# Patient Record
Sex: FEMALE | Race: OTHER | Employment: FULL TIME | URBAN - METROPOLITAN AREA
[De-identification: names, ages, dates, MRNs, and addresses within clinical notes are randomized per-mention and may not be internally consistent; named-entity substitution may affect disease eponyms.]

---

## 2018-11-29 ENCOUNTER — APPOINTMENT (OUTPATIENT)
Dept: PHYSICAL THERAPY | Age: 43
End: 2018-11-29

## 2018-12-05 ENCOUNTER — HOSPITAL ENCOUNTER (OUTPATIENT)
Dept: PHYSICAL THERAPY | Age: 43
Discharge: HOME | End: 2018-12-05
Payer: COMMERCIAL

## 2018-12-05 PROCEDURE — 97161 PT EVAL LOW COMPLEX 20 MIN: CPT | Performed by: PHYSICAL THERAPIST

## 2018-12-05 PROCEDURE — 97110 THERAPEUTIC EXERCISES: CPT | Performed by: PHYSICAL THERAPIST

## 2018-12-05 NOTE — PROGRESS NOTES
PT INITIAL EVALUATION NOTE - [facility] 2-15 Patient Name: Pao Kennedy Date:2018 : 1975 [x]  Patient  Verified Payor: BLUE CROSS / Plan: [facility] PPO / Product Type: PPO / In time:110 P  Out time:210 P Total Treatment Time (min): 60 Total Timed Codes (min): 60 (50 eval, 10 timed see below) Visit #: 1 Treatment Area: Bilateral foot pain [id number], M79.38] SUBJECTIVE Any medication changes, allergies to medications, adverse drug reactions, diagnosis change, or new procedure performed?: [] No    [x] Yes (see summary sheet for update) Current symptoms/chief complaint: (R) foot \"pebble under foot sensation\" under 2nd metatarsal.   
\"scratchy\" under 3rd and 4th metatarsal  
Pt denies low back back pain Date of onset/injury: oct 2018 inc speed while running Pain:   0/10 max 0/10 min 0/10 now Aggravated by: standing prolonged sitting, walking after prolonged sitting, boots with tight toe box Eased by: stretching hip flexor, and stretching toes, cotton ball between toes Any tingling/numbness in LE: none Shoewear/orthotics: new pair of sneakers in July. 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  
Prior level of function/activity level: Since this has occurred, pt has stopped running and biking Continued swimming Continued yoga 1x/week Angie Taylor Patient goal: Eliminate sensation in feet so that pt can do triathalon. Wants to qualify for age group maximum. Would like to do RTC sprint in April. PMH:  Depression OBJECTIVE Observation: pes planus bilaterally (R>L) Gait: Pt \"heavy\" walker with decreased eccentric control noted during loading response bilaterally AROM and PROM (R) ankle WNL all planes MMT (R) ankle 5/5 all planes Tenderness to palpation: (R) adductor hallucis (both oblique and transverse heads) Edema: none Functional Biomechanical Screen Unilat HR on R:10 Unilat HR on L: 10 SLS on R:30 sec SLS on L: 30 sec Munir hop x 3: pain free Squat: full, pain-free Joint Mobility: hypermobility noted (R) talocrural joint. Dec 1st ray mobility OBJECTIVE [x] Skin assessment post-treatment:  [x]intact []redness- no adverse reaction 
  []redness  adverse reaction:  
 
10 min Therapeutic Exercise:  [x] See flow sheet :  
Rationale: increase ROM and increase strength to improve the patients ability to train for triathlon. With 
 [x] TE 
 [] TA 
 [] neuro 
 [] manual: Patient Education: [x] Review HEP [] Progressed/Changed HEP based on:  
[] positioning   [] body mechanics   [] transfers   [x] Ice application- pt advised to ice 10-15 min 1-2 x/day to area in order to dec inflammation 
[x] other:  re: mechanism of injury/condition, role of physical therapy, prognosis for recovery, heat vs ice, activity modifications. Advised trial of off the shelf orthotics Pain Level (0-10 scale) post treatment: 0 
 
ASSESSMENT/Changes in Function:  
 
[x]  See Plan of Care Diego Horne PT, DPT, CMTPT 
PT License Number: [id number]   12/5/2018  1:07 PM

## 2018-12-05 NOTE — PROGRESS NOTES
[employer] [facility] [address] [place], [address] [facility] Phone: [phone]  Fax: [phone] Plan of Care/Statement of Necessity for Physical Therapy Services  2-15 Patient name: Aguilar Sahni  : 1975  Provider#: [id number] Referral source: Pt presents via Direct Access Medical/Treatment Diagnosis: (R) metatarsalgia Prior Hospitalization: see medical history Comorbidities: see evaluation Prior Level of Function:see evaluation Medications: Verified on Patient Summary List 
Start of Care: 2018     Onset Date:see evaluation The Plan of Care and following information is based on the information from the initial evaluation. Assessment/ key information: Patient presents with signs and symptoms consistent with (R) metatarsalgia and will benefit from physical therapy to address deficits noted below in problem list.  
 
Evaluation Complexity History LOW Complexity : Zero comorbidities / personal factors that will impact the outcome / POC; Examination LOW Complexity : 1-2 Standardized tests and measures addressing body structure, function, activity limitation and / or participation in recreation  ;Presentation LOW Complexity : Stable, uncomplicated  ;Clinical Decision Making LOW Complexity : FOTO score of  Overall Complexity Rating: LOW Problem List: pain affecting function, decrease ROM, decrease strength, impaired gait/ balance, decrease ADL/ functional abilitiies, decrease activity tolerance and decrease flexibility/ joint mobility Treatment Plan may include any combination of the following: Therapeutic exercise, Therapeutic activities, Neuromuscular re-education, Physical agent/modality, Manual therapy, Patient education and Self Care training Patient / Family readiness to learn indicated by: asking questions, trying to perform skills and interest 
Persons(s) to be included in education: patient (P) Barriers to Learning/Limitations: None Patient Goal (s): please see evaluation in Connect [facility] Patient Self Reported Health Status: please see paper chart Rehabilitation Potential: excellent Short Term Goals: To be accomplished in 5 treatments: 
-Independent in HEP as evidenced on ability to perform at least 5 exercises from HEP using proper form without verbal cuing. 
-Pt will try off the shelf orthotic trial to see if it decreases pt's foot discomfort Long Term Goals: To be accomplished in 10 treatments: 
-Pt will be able to sit for 2 hours and then stand without discomfort or sensation 
-Pt will be able to run sprints without pain 
-Eliminate sensation plantar surface of foot Frequency / Duration: Patient to be seen 1-2 times per week for 8-10 weeks. *note: pt reports she is going out of town for the next two weeks and declined first avail appt offered after her vacation so follow-up is planned for late Dec 2018 or early Jan 2019. Patient/ Caregiver education and instruction: self care, activity modification and exercises 
 
[x]  Plan of care has been reviewed with PTA Certification Period: 12/5/2018 -  3/4/18 Diego Horne, PT, DPT, CMTPT      29/0/7970 9:90 PM 
PT License Number: [id number] 
_____________________________________________________________________ I certify that the above Therapy Services are being furnished while the patient is under my care. I agree with the treatment plan and certify that this therapy is necessary. [de-identified] Signature:____________________  Date:____________Time:_________

## 2019-01-02 ENCOUNTER — HOSPITAL ENCOUNTER (OUTPATIENT)
Dept: PHYSICAL THERAPY | Age: 44
Discharge: HOME | End: 2019-01-02
Payer: COMMERCIAL

## 2019-01-02 PROCEDURE — 97110 THERAPEUTIC EXERCISES: CPT | Performed by: PHYSICAL THERAPIST

## 2019-01-02 NOTE — PROGRESS NOTES
PT DAILY TREATMENT NOTE - [facility] 2-15 Patient Name: Bebe Code Date:2019 : 1975 [x]  Patient  Verified Payor: BLUE CROSS / Plan: [facility] PPO / Product Type: PPO / In time:110 P  Out time:205 P Total Treatment Time (min): 55 Total Timed Codes (min): 55 
1:1 Treatment Time ([place]): 40 Visit #: 2 Treatment Area: Bilateral foot pain [id number], M79.672] SUBJECTIVE Pain Level (0-10 scale): 0 Any medication changes, allergies to medications, adverse drug reactions, diagnosis change, or new procedure performed?: [x] No    [] Yes (see summary sheet for update) Subjective functional status/changes:    
Pt reports compliance with HEP-no more pain. Has been running, will try to  speed this week some. Pt reports she has been wearing the orthotics. OBJECTIVE [x] Skin assessment post-treatment:  [x]intact []redness- no adverse reaction 
  []redness  adverse reaction:  
 
40 min Therapeutic Exercise:  [x] See flow sheet :  
Rationale: increase ROM, increase strength and improve functional mobility to improve the patients ability to run With 
 [x] TE 
 [] manual 
 [] self care Patient Education: [x] Review HEP [] Progressed/Changed HEP based on:  
[] positioning   [] body mechanics   [] transfers   [] heat/ice application   
[] other:   
 
Other Objective/Functional Measures:  
 
Pain Level (0-10 scale) post treatment: 0 
 
ASSESSMENT Patient will continue to benefit from skilled PT services to modify and progress therapeutic interventions, address functional mobility deficits, address ROM deficits, address strength deficits and analyze and address soft tissue restrictions to attain remaining goals. Progress towards goals / Updated goals: PLAN 
[]  Upgrade activities as tolerated     [x]  Continue plan of care 
[]  Update interventions per flow sheet      
[]  Discharge due to:_ 
[]  Other:_   
 
 Raquel Schmid. Ozzie, PT, DPT, CMTPT    1/2/2019 PT License Number: [id number]

## 2019-01-10 ENCOUNTER — HOSPITAL ENCOUNTER (OUTPATIENT)
Dept: PHYSICAL THERAPY | Age: 44
Discharge: HOME | End: 2019-01-10
Payer: COMMERCIAL

## 2019-01-10 PROCEDURE — 97110 THERAPEUTIC EXERCISES: CPT | Performed by: PHYSICAL THERAPIST

## 2019-01-10 NOTE — PROGRESS NOTES
PT DAILY TREATMENT NOTE - [facility] 2-15 Patient Name: Joanna Lira Date:1/10/2019 : 1975 [x]  Patient  Verified Payor: BLUE CROSS / Plan: [facility] PPO / Product Type: PPO / In time:310 P  Out time:350 P Total Treatment Time (min): 40 Total Timed Codes (min): 40 
1:1 Treatment Time ([facility]): 40 Visit #: 3 Treatment Area: Bilateral foot pain [id number], M79.672] SUBJECTIVE Pain Level (0-10 scale): 0 Any medication changes, allergies to medications, adverse drug reactions, diagnosis change, or new procedure performed?: [x] No    [] Yes (see summary sheet for update) Subjective functional status/changes:    
Pt has been running, swimming, biking without issue. She is very happy with her progress so far. Wonders what will happen once she picks up the pace some. Also nervous about going back to full time work and sitting more. OBJECTIVE [x] Skin assessment post-treatment:  [x]intact []redness- no adverse reaction 
  []redness  adverse reaction:  
 
40 min Therapeutic Exercise:  [x] See flow sheet :  
Rationale: increase ROM, increase strength and improve functional mobility to improve the patients ability to run With 
 [x] TE 
 [] manual 
 [] self care Patient Education: [x] Review HEP [] Progressed/Changed HEP based on:  
[] positioning   [] body mechanics   [] transfers   [] heat/ice application   
[] other:   
 
Other Objective/Functional Measures:  
 
Pain Level (0-10 scale) post treatment: 0 
 
ASSESSMENT Patient will continue to benefit from skilled PT services to modify and progress therapeutic interventions, address functional mobility deficits, address ROM deficits, address strength deficits and analyze and address soft tissue restrictions to attain remaining goals. Progress towards goals / Updated goals: Pt with excellent form noted during new exercises today.   Advised to only speed up one activity at a time as to not exacerbate foot. PLAN 
[]  Upgrade activities as tolerated     [x]  Continue plan of care 
[]  Update interventions per flow sheet      
[]  Discharge due to:_ 
[]  Other:_ Malinda Sanchez. Ozzie PT, DPT, CMTPT    1/10/2019 PT License Number: [id number]

## 2019-02-14 ENCOUNTER — APPOINTMENT (OUTPATIENT)
Dept: PHYSICAL THERAPY | Age: 44
End: 2019-02-14

## 2022-12-25 ENCOUNTER — HOSPITAL ENCOUNTER (EMERGENCY)
Age: 47
Discharge: HOME | End: 2022-12-25
Attending: EMERGENCY MEDICINE
Payer: COMMERCIAL

## 2022-12-25 VITALS
HEIGHT: 62 IN | RESPIRATION RATE: 16 BRPM | SYSTOLIC BLOOD PRESSURE: 123 MMHG | DIASTOLIC BLOOD PRESSURE: 86 MMHG | HEART RATE: 63 BPM | WEIGHT: 118.17 LBS | OXYGEN SATURATION: 98 % | TEMPERATURE: 98.4 F | BODY MASS INDEX: 21.75 KG/M2

## 2022-12-25 DIAGNOSIS — U07.1 COVID-19: Primary | ICD-10-CM

## 2022-12-25 DIAGNOSIS — J06.9 VIRAL URI WITH COUGH: ICD-10-CM

## 2022-12-25 DIAGNOSIS — J02.9 SORE THROAT: ICD-10-CM

## 2022-12-25 PROCEDURE — 74011250637 HC RX REV CODE- 250/637: Performed by: EMERGENCY MEDICINE

## 2022-12-25 PROCEDURE — 99283 EMERGENCY DEPT VISIT LOW MDM: CPT

## 2022-12-25 RX ORDER — NAPROXEN 500 MG/1
500 TABLET ORAL
Qty: 20 TABLET | Refills: 0 | Status: SHIPPED | OUTPATIENT
Start: 2022-12-25 | End: 2023-01-04

## 2022-12-25 RX ORDER — ACETAMINOPHEN 500 MG
1000 TABLET ORAL ONCE
Status: COMPLETED | OUTPATIENT
Start: 2022-12-25 | End: 2022-12-25

## 2022-12-25 RX ORDER — BENZONATATE 100 MG/1
100 CAPSULE ORAL
Status: COMPLETED | OUTPATIENT
Start: 2022-12-25 | End: 2022-12-25

## 2022-12-25 RX ORDER — NAPROXEN 250 MG/1
500 TABLET ORAL
Status: COMPLETED | OUTPATIENT
Start: 2022-12-25 | End: 2022-12-25

## 2022-12-25 RX ORDER — BENZONATATE 100 MG/1
100 CAPSULE ORAL
Qty: 30 CAPSULE | Refills: 0 | Status: SHIPPED | OUTPATIENT
Start: 2022-12-25 | End: 2023-01-01

## 2022-12-25 RX ADMIN — BENZONATATE 100 MG: 100 CAPSULE ORAL at 02:49

## 2022-12-25 RX ADMIN — NAPROXEN 500 MG: 250 TABLET ORAL at 02:49

## 2022-12-25 RX ADMIN — ACETAMINOPHEN 1000 MG: 500 TABLET, FILM COATED ORAL at 02:49

## 2022-12-25 NOTE — ED TRIAGE NOTES
Triage note: has had covid x 1 week patient tested positive for covid 4 days ago has had sore throat nasal congestion and dry cough. denies fever sore throat got worse last night. took advil at 10pm without relief of pain.

## 2022-12-25 NOTE — ROUTINE PROCESS
Emergency Room Nursing Note        Patient Name: Laurence Carlton      : 1975             MRN: [id number]      Chief Complaint: Positive For Covid-19      Admit Diagnosis: No admission diagnoses are documented for this encounter. Surgery: * No surgery found *            MD/RN reviewed discharge instructions and options with patient. Patient verbalized understanding. RN reviewed discharge instructions using teach back method. Patient ambulatory to exit without difficulty and no acute signs of distress. No complaints or needs expressed at this time. Patient counseled on medications prescribed at discharge (If prescribed). Vital signs stable. Patient to follow up with PCP/Specialist on the next business day for appointment. All questions answered by ER RN.           Lines:        Vitals: Patient Vitals for the past 12 hrs:   Temp Pulse Resp BP SpO2   22 0220 98.4 °F (36.9 °C) 63 16 123/86 98 %         Signed by: Rafa Cross RN, VISHNU, BSN, CMSRN                                              2022 at 3:30 AM

## 2022-12-25 NOTE — Clinical Note
[facility]  [address]  [address]  [phone]    Work/School Note    Date: 12/25/2022     To Whom It May concern:    Nettie Mustafa was evaluated by the following provider(s):  Attending Provider: Jerrod  virus is suspected. Per the CDC guidelines we recommend home isolation until the following conditions are all met:    1. At least five days have passed since symptoms first appeared and/or had a close exposure,   2. After home isolation for five days, wearing a mask around others for the next five days,  3. At least 24 have passed since last fever without the use of fever-reducing medications and  4.  Symptoms (eg cough, shortness of breath) have improved      Sincerely,          Saman Johnson DO

## 2022-12-25 NOTE — ED PROVIDER NOTES
78-year-old female with no significant past medical history, vaccinated and boosted against COVID-19, presents to the emergency department with her  stating that he became sick with COVID-19 1 week ago and then 4 days ago she began having symptoms. She has had nasal congestion, nonproductive cough, and sore throat and myalgias. She presents to the emergency department this morning predominantly complaining of sore throat. She took a dose of Advil at 10 PM to no significant avail of her symptoms. She denies any significant shortness of breath, nausea, vomiting, abdominal pain, or any other medical concerns. She states that the last time she had a sore throat this bad she had mono. She states that she has never had COVID-19 before. Positive For Covid-19  Associated symptoms: congestion, cough, rhinorrhea and sore throat    Associated symptoms: no chest pain, no chills, no diarrhea, no dysuria, no headaches, no myalgias, no nausea, no rash and no vomiting       No past medical history on file. No past surgical history on file. No family history on file.     Social History     Socioeconomic History    Marital status:      Spouse name: Not on file    Number of children: Not on file    Years of education: Not on file    Highest education level: Not on file   Occupational History    Not on file   Tobacco Use    Smoking status: Not on file    Smokeless tobacco: Not on file   Substance and Sexual Activity    Alcohol use: Not on file    Drug use: Not on file    Sexual activity: Not on file   Other Topics Concern    Not on file   Social History Narrative    Not on file     Social Determinants of Health     Financial Resource Strain: Not on file   Food Insecurity: Not on file   Transportation Needs: Not on file   Physical Activity: Not on file   Stress: Not on file   Social Connections: Not on file   Intimate Partner Violence: Not on file   Housing Stability: Not on file         ALLERGIES: Patient has no known allergies. Review of Systems   Constitutional:  Positive for activity change and fatigue. Negative for appetite change, chills and fever. HENT:  Positive for congestion, rhinorrhea and sore throat. Negative for sinus pressure and sneezing. Eyes:  Negative for photophobia and visual disturbance. Respiratory:  Positive for cough. Negative for shortness of breath. Cardiovascular:  Negative for chest pain. Gastrointestinal:  Negative for abdominal pain, blood in stool, constipation, diarrhea, nausea and vomiting. Genitourinary:  Negative for difficulty urinating, dysuria, flank pain, frequency, hematuria, menstrual problem, urgency, vaginal bleeding and vaginal discharge. Musculoskeletal:  Negative for arthralgias, back pain, myalgias and neck pain. Skin:  Negative for rash and wound. Neurological:  Negative for syncope, weakness, numbness and headaches. Psychiatric/Behavioral:  Negative for self-injury and suicidal ideas. All other systems reviewed and are negative. Vitals:    12/25/22 0220   BP: 123/86   Pulse: 63   Resp: 16   Temp: 98.4 °F (36.9 °C)   SpO2: 98%   Weight: 53.6 kg (118 lb 2.7 oz)   Height: 5' 2\" (1.575 m)            Physical Exam  Vitals and nursing note reviewed. Constitutional:       General: She is not in acute distress. Appearance: Normal appearance. She is well-developed. She is not diaphoretic. Comments: Pleasant, no acute distress, speaking in full sentences, tolerating secretions without difficulty. HENT:      Head: Normocephalic and atraumatic. Nose: Congestion and rhinorrhea present. Mouth/Throat:      Pharynx: Posterior oropharyngeal erythema present. No oropharyngeal exudate. Comments: Mildly erythematous posterior oropharynx without tonsillar exudates, asymmetric swelling, or trismus. Eyes:      Extraocular Movements: Extraocular movements intact.       Conjunctiva/sclera: Conjunctivae normal.      Pupils: Pupils are equal, round, and reactive to light. Cardiovascular:      Rate and Rhythm: Normal rate and regular rhythm. Heart sounds: Normal heart sounds. Pulmonary:      Effort: Pulmonary effort is normal.      Breath sounds: Normal breath sounds. Comments: Intermittent nonproductive cough. Abdominal:      General: There is no distension. Palpations: Abdomen is soft. Tenderness: There is no abdominal tenderness. Musculoskeletal:         General: No tenderness. Cervical back: Neck supple. Skin:     General: Skin is warm and dry. Neurological:      General: No focal deficit present. Mental Status: She is alert and oriented to person, place, and time. Cranial Nerves: No cranial nerve deficit. Sensory: No sensory deficit. Motor: No weakness. Coordination: Coordination normal.        MDM    44-year-old female with COVID-19 presents with nasal congestion and sore throat afebrile vital signs stable no acute distress. Lungs CTA B without shortness of breath. She was advised that sore throat is one of the most common symptoms of COVID-19. She was recommended honey, and given dose of Naprosyn and Tessalon Perles in the ED as well as Tylenol. Recommended Naprosyn, Tylenol as needed for further symptomatic relief of sore throat. This is a viral illness and will need to continue to run its course but return precautions were given for worsening or concerns with specific emphasis placed on worsening shortness of breath. This plan was discussed with the patient and her  at the bedside and they stated both understanding and agreement. Please note that this dictation was completed with [employer], the computer voice recognition software. Quite often unanticipated grammatical, syntax, homophones, and other interpretive errors are inadvertently transcribed by the computer software. Please disregard these errors.   Please excuse any errors that have escaped final proofreading.       Procedures

## 2022-12-25 NOTE — Clinical Note
[facility]  [address]  [address]  [phone]    Work/School Note    Date: 12/25/2022     To Whom It May concern:    Abbie Christopher was evaluated by the following provider(s):  Attending Provider: Cinthya Edwards virus is suspected. Per the CDC guidelines we recommend home isolation until the following conditions are all met:    1. At least five days have passed since symptoms first appeared and/or had a close exposure,   2. After home isolation for five days, wearing a mask around others for the next five days,  3. At least 24 have passed since last fever without the use of fever-reducing medications and  4.  Symptoms (eg cough, shortness of breath) have improved      Sincerely,          Mega Greco, DO